# Patient Record
Sex: MALE | Race: WHITE | HISPANIC OR LATINO | Employment: UNEMPLOYED | URBAN - NONMETROPOLITAN AREA
[De-identification: names, ages, dates, MRNs, and addresses within clinical notes are randomized per-mention and may not be internally consistent; named-entity substitution may affect disease eponyms.]

---

## 2018-12-17 ENCOUNTER — OFFICE VISIT (OUTPATIENT)
Dept: OTOLARYNGOLOGY | Facility: CLINIC | Age: 83
End: 2018-12-17

## 2018-12-17 VITALS
RESPIRATION RATE: 20 BRPM | SYSTOLIC BLOOD PRESSURE: 133 MMHG | DIASTOLIC BLOOD PRESSURE: 78 MMHG | WEIGHT: 168 LBS | TEMPERATURE: 98 F | HEART RATE: 65 BPM | HEIGHT: 70 IN | BODY MASS INDEX: 24.05 KG/M2

## 2018-12-17 DIAGNOSIS — J32.3 CHRONIC SPHENOIDAL SINUSITIS: ICD-10-CM

## 2018-12-17 DIAGNOSIS — S02.40EA CLOSED FRACTURE OF RIGHT ZYGOMATIC ARCH, INITIAL ENCOUNTER (HCC): Primary | ICD-10-CM

## 2018-12-17 PROCEDURE — 99203 OFFICE O/P NEW LOW 30 MIN: CPT | Performed by: OTOLARYNGOLOGY

## 2018-12-17 RX ORDER — DILTIAZEM HYDROCHLORIDE 180 MG/1
CAPSULE, EXTENDED RELEASE ORAL
COMMUNITY
Start: 2018-09-23

## 2018-12-17 RX ORDER — CEPHALEXIN 500 MG/1
CAPSULE ORAL
Refills: 0 | COMMUNITY
Start: 2018-11-19 | End: 2018-12-17

## 2018-12-17 RX ORDER — TAMSULOSIN HYDROCHLORIDE 0.4 MG/1
CAPSULE ORAL
COMMUNITY
Start: 2018-11-10

## 2018-12-17 RX ORDER — GABAPENTIN 100 MG/1
CAPSULE ORAL
COMMUNITY
Start: 2018-12-04

## 2018-12-17 RX ORDER — INFLUENZA A VIRUS A/MICHIGAN/45/2015 X-275 (H1N1) ANTIGEN (FORMALDEHYDE INACTIVATED), INFLUENZA A VIRUS A/SINGAPORE/INFIMH-16-0019/2016 IVR-186 (H3N2) ANTIGEN (FORMALDEHYDE INACTIVATED), AND INFLUENZA B VIRUS B/MARYLAND/15/2016 BX-69A (A B/COLORADO/6/2017-LIKE VIRUS) ANTIGEN (FORMALDEHYDE INACTIVATED) 60; 60; 60 UG/.5ML; UG/.5ML; UG/.5ML
INJECTION, SUSPENSION INTRAMUSCULAR
COMMUNITY
Start: 2018-09-19

## 2018-12-17 RX ORDER — ISOSORBIDE MONONITRATE 60 MG/1
TABLET, EXTENDED RELEASE ORAL
COMMUNITY
Start: 2018-10-24

## 2018-12-17 RX ORDER — HYDROCODONE BITARTRATE AND ACETAMINOPHEN 5; 325 MG/1; MG/1
TABLET ORAL
COMMUNITY
Start: 2018-12-04 | End: 2018-12-17

## 2018-12-17 RX ORDER — CLOPIDOGREL BISULFATE 75 MG/1
TABLET ORAL
COMMUNITY
Start: 2018-10-04

## 2018-12-17 RX ORDER — MESALAMINE 400 MG/1
CAPSULE, DELAYED RELEASE ORAL
COMMUNITY
Start: 2018-09-13

## 2018-12-17 RX ORDER — METHYLPREDNISOLONE 4 MG/1
TABLET ORAL
COMMUNITY
Start: 2018-12-04 | End: 2018-12-17

## 2018-12-17 RX ORDER — PANTOPRAZOLE SODIUM 40 MG/1
TABLET, DELAYED RELEASE ORAL
COMMUNITY
Start: 2018-10-08

## 2018-12-17 RX ORDER — LOVASTATIN 20 MG/1
TABLET ORAL
COMMUNITY
Start: 2018-10-01

## 2018-12-17 NOTE — PROGRESS NOTES
PRIMARY CARE PROVIDER: Trevor Camargo MD  REFERRING PROVIDER: No ref. provider found    Chief Complaint   Patient presents with   • Facial Swelling     Facial Fracture       Subjective   History of Present Illness:  Navin Clemente is a  87 y.o. male who presents with complaints of a right sided zygoma fracture.  This occurred while on vacation [place] on 11/18/2018.  He tripped and fell and struck the right side of his zygoma.  He reports bruising in the area.  He had initial pain in that area that is completely resolved.  He denies any difficulty chewing or opening his mouth.  He denies any malocclusion.  He denies any double vision.  She denies any facial numbness or weakness.    He has seen Dr. Pena, an otolaryngologist in [place] for a right-sided neck mass.  It sounds that a fine-needle aspiration has been performed and was benign.  He has follow-up scheduled with Dr. Pena within the next few months.    Mr. Clemente reports chronic nasal congestion and rhinorrhea.  The septum present for many years.  Nothing makes this better or worse.  He denies any history of sinus infections or sinus pain or pressure.  He is not diabetic.    On Plavix    Review of Systems:  Review of Systems   Constitutional: Negative for chills and fever.   HENT: Negative for facial swelling, sinus pressure, sinus pain, sore throat and trouble swallowing.    Musculoskeletal: Negative for neck pain.   Skin: Positive for color change and wound.   Neurological: Negative for facial asymmetry.   Hematological: Positive for adenopathy.   Psychiatric/Behavioral: Negative for agitation and confusion.       Past History:  Past Medical History:   Diagnosis Date   • Stroke (CMS/HCC)      Past Surgical History:   Procedure Laterality Date   • CARDIAC CATHETERIZATION     • HERNIA REPAIR       History reviewed. No pertinent family history.  Social History     Tobacco Use   • Smoking status: Never Smoker   • Smokeless tobacco:  Current User     Types: Chew   Substance Use Topics   • Alcohol use: Yes     Comment:  1 per day   • Drug use: Not on file     Allergies:  Patient has no known allergies.    Current Outpatient Medications:   •  CARTIA  MG 24 hr capsule, , Disp: , Rfl:   •  clopidogrel (PLAVIX) 75 MG tablet, , Disp: , Rfl:   •  DELZICOL 400 MG capsule delayed-release delayed release capsule, , Disp: , Rfl:   •  FLUZONE HIGH-DOSE 0.5 ML suspension prefilled syringe injection, , Disp: , Rfl:   •  gabapentin (NEURONTIN) 100 MG capsule, , Disp: , Rfl:   •  isosorbide mononitrate (IMDUR) 60 MG 24 hr tablet, , Disp: , Rfl:   •  lovastatin (MEVACOR) 20 MG tablet, , Disp: , Rfl:   •  pantoprazole (PROTONIX) 40 MG EC tablet, , Disp: , Rfl:   •  tamsulosin (FLOMAX) 0.4 MG capsule 24 hr capsule, , Disp: , Rfl:       Objective     Vital Signs:  Temp:  [98 °F (36.7 °C)] 98 °F (36.7 °C)  Heart Rate:  [65] 65  Resp:  [20] 20  BP: (133)/(78) 133/78    Physical Exam:  Physical Exam   Constitutional: He is oriented to person, place, and time. He appears well-developed and well-nourished. He is cooperative. No distress.   HENT:   Head: Normocephalic and atraumatic.       Right Ear: External ear normal. Decreased hearing is noted.   Left Ear: External ear normal. Decreased hearing is noted.   Nose: Nose normal. No nasal deformity or septal deviation.   Mouth/Throat: Uvula is midline, oropharynx is clear and moist and mucous membranes are normal. He has dentures. Normal dentition.   Bilateral hearing aids   Eyes: Conjunctivae and EOM are normal. Pupils are equal, round, and reactive to light. Right eye exhibits no discharge. Left eye exhibits no discharge. No scleral icterus.   Neck: Normal range of motion. Neck supple. No JVD present. No tracheal deviation present. No thyromegaly present.   Pulmonary/Chest: Effort normal. No stridor.   Musculoskeletal: Normal range of motion. He exhibits no edema or deformity.   Lymphadenopathy:     He has no  cervical adenopathy.   Neurological: He is alert and oriented to person, place, and time. He has normal strength. No cranial nerve deficit. Coordination normal.   Skin: Skin is warm and dry. No rash noted. He is not diaphoretic. No erythema. No pallor.   Psychiatric: He has a normal mood and affect. His speech is normal and behavior is normal. Judgment and thought content normal. Cognition and memory are normal.   Nursing note and vitals reviewed.      Results Review:         Assessment   Assessment:  1. Closed fracture of right zygomatic arch, initial encounter (CMS/[facility])    2. Chronic sphenoidal sinusitis        Plan   Plan:    I discussed that he is 1 month out from a depressed fracture of the right zygomatic arch.  Unfortunately, I do not have the images to review.  Fortunately, he has had no complaints of facial asymmetry or restriction of opening the mouth or pain with mastication.  Because of this, I feel that he would benefit from continued observation of the right zygomatic fracture.    He has seen Dr. Pena in the past for a right-sided neck mass.  He is scheduled to see Dr. Pena back in the next few months.   questions why he is seeing me, APRN is set up to see and has been seeing an otolaryngologist.  I think it would be reasonable to have him follow-up with Dr. Pena to review this CT scan of the maxillofacial area with regards to the sinusitis.  I do not feel that he needs any intervention for his zygomatic arch fracture.   Reviewing the CT scan report, he does have a high density sinus disease throughout the right sphenoid sinus and possibly through the ethmoid sinuses.  There is concern that this may be fungal sinusitis on the report, I would think this seems more consistent with allergic fungal sinusitis and not an invasive sinusitis.  We will call to the [facility] to have the CT scan of the maxillofacial area sent on CD to the patient's house.  I instructed him to bring the CT  scan report and the CT to Dr. Pena's attention on his follow-up appointment.    My findings and recommendations were discussed and questions were answered.     Valentin Victor MD  12/17/18  3:20 PM